# Patient Record
Sex: MALE | Race: WHITE
[De-identification: names, ages, dates, MRNs, and addresses within clinical notes are randomized per-mention and may not be internally consistent; named-entity substitution may affect disease eponyms.]

---

## 2019-04-25 ENCOUNTER — HOSPITAL ENCOUNTER (EMERGENCY)
Dept: HOSPITAL 53 - M ED | Age: 24
Discharge: HOME | End: 2019-04-25
Payer: COMMERCIAL

## 2019-04-25 VITALS — DIASTOLIC BLOOD PRESSURE: 81 MMHG | SYSTOLIC BLOOD PRESSURE: 134 MMHG

## 2019-04-25 VITALS — HEIGHT: 74 IN | BODY MASS INDEX: 23.15 KG/M2 | WEIGHT: 180.38 LBS
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
## 2019-04-25 DIAGNOSIS — F17.200: ICD-10-CM
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
## 2019-04-25 DIAGNOSIS — S46.812A: Primary | ICD-10-CM

## 2019-11-06 NOTE — REP
LEFT SHOULDER, THREE VIEWS:

 

HISTORY:  Trauma.

 

There is no acute fracture or dislocation.  The joint space are normal in

appearance.

 

IMPRESSION:

 

There is no acute fracture or dislocation.

 

 

Electronically Signed by

Kelvin Richardson MD 04/26/2019 09:02 A Island Pedicle Flap-Requiring Vessel Identification Text: The defect edges were debeveled with a #15 scalpel blade.  Given the location of the defect, shape of the defect and the proximity to free margins an island pedicle advancement flap was deemed most appropriate.  Using a sterile surgical marker, an appropriate advancement flap was drawn, based on the axial vessel mentioned above, incorporating the defect, outlining the appropriate donor tissue and placing the expected incisions within the relaxed skin tension lines where possible.    The area thus outlined was incised deep to adipose tissue with a #15 scalpel blade.  The skin margins were undermined to an appropriate distance in all directions around the primary defect and laterally outward around the island pedicle utilizing iris scissors.  There was minimal undermining beneath the pedicle flap.